# Patient Record
Sex: FEMALE | Race: BLACK OR AFRICAN AMERICAN | NOT HISPANIC OR LATINO | URBAN - METROPOLITAN AREA
[De-identification: names, ages, dates, MRNs, and addresses within clinical notes are randomized per-mention and may not be internally consistent; named-entity substitution may affect disease eponyms.]

---

## 2020-07-06 ENCOUNTER — OFFICE VISIT (OUTPATIENT)
Dept: URBAN - METROPOLITAN AREA CLINIC 118 | Facility: CLINIC | Age: 55
End: 2020-07-06

## 2020-07-17 ENCOUNTER — OFFICE VISIT (OUTPATIENT)
Dept: URBAN - METROPOLITAN AREA CLINIC 118 | Facility: CLINIC | Age: 55
End: 2020-07-17
Payer: MEDICARE

## 2020-07-17 DIAGNOSIS — R10.9 ABDOMINAL PAIN: ICD-10-CM

## 2020-07-17 DIAGNOSIS — K21.9 GERD: ICD-10-CM

## 2020-07-17 DIAGNOSIS — K58.9 IBS (IRRITABLE BOWEL SYNDROME) DIARRHEA PREDOMINANT: ICD-10-CM

## 2020-07-17 DIAGNOSIS — R19.7 DIARRHEA: ICD-10-CM

## 2020-07-17 PROCEDURE — 99214 OFFICE O/P EST MOD 30 MIN: CPT | Performed by: INTERNAL MEDICINE

## 2020-07-17 PROCEDURE — 1036F TOBACCO NON-USER: CPT | Performed by: INTERNAL MEDICINE

## 2020-07-17 PROCEDURE — G8427 DOCREV CUR MEDS BY ELIG CLIN: HCPCS | Performed by: INTERNAL MEDICINE

## 2020-07-17 PROCEDURE — G8417 CALC BMI ABV UP PARAM F/U: HCPCS | Performed by: INTERNAL MEDICINE

## 2020-07-17 PROCEDURE — 3017F COLORECTAL CA SCREEN DOC REV: CPT | Performed by: INTERNAL MEDICINE

## 2020-07-17 RX ORDER — CYCLOBENZAPRINE HYDROCHLORIDE 5 MG/1
TABLET, FILM COATED ORAL
Qty: 0 | Refills: 0 | Status: ACTIVE | COMMUNITY
Start: 1900-01-01

## 2020-07-17 RX ORDER — FLUTICASONE PROPIONATE 50 UG/1
SPRAY, METERED NASAL
Qty: 0 | Refills: 0 | Status: ACTIVE | COMMUNITY
Start: 1900-01-01

## 2020-07-17 RX ORDER — CITALOPRAM 20 MG/1
TABLET ORAL
Qty: 0 | Refills: 0 | Status: ACTIVE | COMMUNITY
Start: 1900-01-01

## 2020-07-17 NOTE — HPI-TODAY'S VISIT:
The patient presents for f/u appt; reports having increased stress/anxiety recently which has in turn led to more frequent bm's and mild abd discomfort.  takes pancreatic enzymes which has helped until recently.  denies weight loss or gi bleeding.  recent colonoscopy without significant findings.

## 2020-08-14 ENCOUNTER — ERX REFILL RESPONSE (OUTPATIENT)
Dept: URBAN - METROPOLITAN AREA CLINIC 118 | Facility: CLINIC | Age: 55
End: 2020-08-14

## 2020-08-14 RX ORDER — RIFAXIMIN 550 MG/1
1 TABLET TABLET ORAL THREE TIMES A DAY
Qty: 42 | Refills: 2

## 2020-10-09 ENCOUNTER — OFFICE VISIT (OUTPATIENT)
Dept: URBAN - METROPOLITAN AREA CLINIC 118 | Facility: CLINIC | Age: 55
End: 2020-10-09
Payer: MEDICARE

## 2020-10-09 DIAGNOSIS — R19.7 DIARRHEA: ICD-10-CM

## 2020-10-09 DIAGNOSIS — K21.9 GERD: ICD-10-CM

## 2020-10-09 DIAGNOSIS — R10.9 ABDOMINAL PAIN: ICD-10-CM

## 2020-10-09 DIAGNOSIS — K58.9 IBS (IRRITABLE BOWEL SYNDROME) DIARRHEA PREDOMINANT: ICD-10-CM

## 2020-10-09 PROCEDURE — 3017F COLORECTAL CA SCREEN DOC REV: CPT | Performed by: INTERNAL MEDICINE

## 2020-10-09 PROCEDURE — 99214 OFFICE O/P EST MOD 30 MIN: CPT | Performed by: INTERNAL MEDICINE

## 2020-10-09 PROCEDURE — G8417 CALC BMI ABV UP PARAM F/U: HCPCS | Performed by: INTERNAL MEDICINE

## 2020-10-09 PROCEDURE — 1036F TOBACCO NON-USER: CPT | Performed by: INTERNAL MEDICINE

## 2020-10-09 PROCEDURE — G8427 DOCREV CUR MEDS BY ELIG CLIN: HCPCS | Performed by: INTERNAL MEDICINE

## 2020-10-09 PROCEDURE — G8482 FLU IMMUNIZE ORDER/ADMIN: HCPCS | Performed by: INTERNAL MEDICINE

## 2020-10-09 RX ORDER — FLUTICASONE PROPIONATE 50 UG/1
SPRAY, METERED NASAL
Qty: 0 | Refills: 0 | Status: ACTIVE | COMMUNITY
Start: 1900-01-01

## 2020-10-09 RX ORDER — DICYCLOMINE HYDROCHLORIDE 10 MG/1
1 TABLET CAPSULE ORAL
Qty: 30 | Refills: 1 | OUTPATIENT
Start: 2020-10-09 | End: 2020-12-07

## 2020-10-09 RX ORDER — RIFAXIMIN 550 MG/1
1 TABLET TABLET ORAL THREE TIMES A DAY
Qty: 42 | Refills: 2 | Status: ACTIVE | COMMUNITY

## 2020-10-09 RX ORDER — CYCLOBENZAPRINE HYDROCHLORIDE 5 MG/1
TABLET, FILM COATED ORAL
Qty: 0 | Refills: 0 | Status: ACTIVE | COMMUNITY
Start: 1900-01-01

## 2020-10-09 RX ORDER — CITALOPRAM 20 MG/1
TABLET ORAL
Qty: 0 | Refills: 0 | Status: ACTIVE | COMMUNITY
Start: 1900-01-01

## 2020-10-09 NOTE — HPI-TODAY'S VISIT:
The patient presents for f/u appt; diarrhea has improved since last appt.  main complaint is mid/lower abd discomfort, occurs 2-3 times per week, unrelated to meals, and lasts ~30 minutes.  reports continues stress/anxiety and social stressors which she feels makes abd symptoms worse.  on citalopram which she feels has helped some with sx's.

## 2020-10-16 ENCOUNTER — OFFICE VISIT (OUTPATIENT)
Dept: URBAN - METROPOLITAN AREA CLINIC 118 | Facility: CLINIC | Age: 55
End: 2020-10-16

## 2021-01-15 ENCOUNTER — OFFICE VISIT (OUTPATIENT)
Dept: URBAN - METROPOLITAN AREA CLINIC 118 | Facility: CLINIC | Age: 56
End: 2021-01-15

## 2021-01-22 ENCOUNTER — OFFICE VISIT (OUTPATIENT)
Dept: URBAN - METROPOLITAN AREA CLINIC 118 | Facility: CLINIC | Age: 56
End: 2021-01-22
Payer: MEDICARE

## 2021-01-22 DIAGNOSIS — K21.9 GERD: ICD-10-CM

## 2021-01-22 DIAGNOSIS — K58.9 IBS (IRRITABLE BOWEL SYNDROME) DIARRHEA PREDOMINANT: ICD-10-CM

## 2021-01-22 DIAGNOSIS — R10.9 ABDOMINAL PAIN: ICD-10-CM

## 2021-01-22 DIAGNOSIS — R19.7 DIARRHEA: ICD-10-CM

## 2021-01-22 PROBLEM — 62315008 DIARRHEA: Status: ACTIVE | Noted: 2020-07-17

## 2021-01-22 PROCEDURE — G8427 DOCREV CUR MEDS BY ELIG CLIN: HCPCS | Performed by: INTERNAL MEDICINE

## 2021-01-22 PROCEDURE — G8483 FLU IMM NO ADMIN DOC REA: HCPCS | Performed by: INTERNAL MEDICINE

## 2021-01-22 PROCEDURE — 1036F TOBACCO NON-USER: CPT | Performed by: INTERNAL MEDICINE

## 2021-01-22 PROCEDURE — 99214 OFFICE O/P EST MOD 30 MIN: CPT | Performed by: INTERNAL MEDICINE

## 2021-01-22 PROCEDURE — 3017F COLORECTAL CA SCREEN DOC REV: CPT | Performed by: INTERNAL MEDICINE

## 2021-01-22 PROCEDURE — G8417 CALC BMI ABV UP PARAM F/U: HCPCS | Performed by: INTERNAL MEDICINE

## 2021-01-22 RX ORDER — FLUTICASONE PROPIONATE 50 UG/1
SPRAY, METERED NASAL
Qty: 0 | Refills: 0 | Status: ACTIVE | COMMUNITY
Start: 1900-01-01

## 2021-01-22 RX ORDER — CITALOPRAM 20 MG/1
TABLET ORAL
Qty: 0 | Refills: 0 | Status: ACTIVE | COMMUNITY
Start: 1900-01-01

## 2021-01-22 RX ORDER — RIFAXIMIN 550 MG/1
1 TABLET TABLET ORAL THREE TIMES A DAY
Qty: 42 | Refills: 2 | Status: ACTIVE | COMMUNITY

## 2021-01-22 RX ORDER — CYCLOBENZAPRINE HYDROCHLORIDE 5 MG/1
TABLET, FILM COATED ORAL
Qty: 0 | Refills: 0 | Status: ACTIVE | COMMUNITY
Start: 1900-01-01

## 2021-01-22 RX ORDER — PANTOPRAZOLE SODIUM 40 MG/1
1 TABLET TABLET, DELAYED RELEASE ORAL ONCE A DAY
Qty: 90 TABLET | Refills: 3 | OUTPATIENT
Start: 2021-01-22

## 2021-01-22 RX ORDER — DICYCLOMINE HYDROCHLORIDE 10 MG/1
1 TABLET CAPSULE ORAL
Qty: 60 | Refills: 3 | OUTPATIENT
Start: 2021-01-22 | End: 2021-05-22

## 2021-01-22 NOTE — HPI-TODAY'S VISIT:
The patient presents for f/u appt; h/o IBS-D; doing mostly well.  low fodmap diet has helped with symptoms (less frequent diarrhea and abd pain).  takes dicyclomine prn.  weight stable.  denies gi bleeding.

## 2021-07-28 NOTE — PHYSICAL EXAM LYMPHATIC:
Neck , no lymphadenopathy
Your evaluated for pain after injuries.  Your x-rays and CT scans were reassuring, did not show any evidence of fracture or other injury.  However you should take Tylenol and or ibuprofen as needed for pain control.  You should use the left arm sling support as needed.  You should follow-up with a primary doctor.  
oral

## 2021-07-30 ENCOUNTER — OFFICE VISIT (OUTPATIENT)
Dept: URBAN - METROPOLITAN AREA CLINIC 118 | Facility: CLINIC | Age: 56
End: 2021-07-30
Payer: MEDICARE

## 2021-07-30 ENCOUNTER — DASHBOARD ENCOUNTERS (OUTPATIENT)
Age: 56
End: 2021-07-30

## 2021-07-30 DIAGNOSIS — K21.9 GERD: ICD-10-CM

## 2021-07-30 DIAGNOSIS — K58.9 IBS (IRRITABLE BOWEL SYNDROME) DIARRHEA PREDOMINANT: ICD-10-CM

## 2021-07-30 DIAGNOSIS — R10.9 ABDOMINAL PAIN: ICD-10-CM

## 2021-07-30 PROBLEM — 10743008 IRRITABLE BOWEL SYNDROME: Status: ACTIVE | Noted: 2020-07-17

## 2021-07-30 PROCEDURE — 99213 OFFICE O/P EST LOW 20 MIN: CPT | Performed by: INTERNAL MEDICINE

## 2021-07-30 RX ORDER — DICYCLOMINE HYDROCHLORIDE 10 MG/1
1 TABLET CAPSULE ORAL
Qty: 60 | Refills: 3
Start: 2021-01-22 | End: 2021-11-27

## 2021-07-30 RX ORDER — PANTOPRAZOLE SODIUM 40 MG/1
1 TABLET TABLET, DELAYED RELEASE ORAL ONCE A DAY
Qty: 90 TABLET | Refills: 3 | OUTPATIENT

## 2021-07-30 RX ORDER — FLUTICASONE PROPIONATE 50 UG/1
SPRAY, METERED NASAL
Qty: 0 | Refills: 0 | Status: ACTIVE | COMMUNITY
Start: 1900-01-01

## 2021-07-30 RX ORDER — CITALOPRAM 20 MG/1
TABLET ORAL
Qty: 0 | Refills: 0 | Status: ACTIVE | COMMUNITY
Start: 1900-01-01

## 2021-07-30 RX ORDER — RIFAXIMIN 550 MG/1
1 TABLET TABLET ORAL THREE TIMES A DAY
Qty: 42 | Refills: 2 | Status: ACTIVE | COMMUNITY

## 2021-07-30 RX ORDER — PANTOPRAZOLE SODIUM 40 MG/1
1 TABLET TABLET, DELAYED RELEASE ORAL ONCE A DAY
Qty: 90 TABLET | Refills: 3 | Status: ACTIVE | COMMUNITY
Start: 2021-01-22

## 2021-07-30 RX ORDER — CYCLOBENZAPRINE HYDROCHLORIDE 5 MG/1
TABLET, FILM COATED ORAL
Qty: 0 | Refills: 0 | Status: ACTIVE | COMMUNITY
Start: 1900-01-01

## 2021-07-30 NOTE — HPI-TODAY'S VISIT:
The patient presents for f/u appt; h/o IBS-mixed (mostly diarrhea), takes dicyclomine prn (~2 per month) for abd pain which helps relieve sx's.  denies new gi complaints or worsening of gi symptoms.

## 2024-02-29 NOTE — PHYSICAL EXAM CHEST:
no lesions,  no deformities,  no traumatic injuries,  no significant scars are present,  chest wall non-tender,  no masses present, breathing is unlabored without accessory muscle use,normal breath sounds
2 = difficulty speaking (not related to language barrier)

## 2025-05-01 ENCOUNTER — TELEPHONE ENCOUNTER (OUTPATIENT)
Dept: URBAN - METROPOLITAN AREA CLINIC 118 | Facility: CLINIC | Age: 60
End: 2025-05-01

## 2025-07-07 ENCOUNTER — LAB OUTSIDE AN ENCOUNTER (OUTPATIENT)
Dept: URBAN - METROPOLITAN AREA CLINIC 118 | Facility: CLINIC | Age: 60
End: 2025-07-07

## 2025-07-07 ENCOUNTER — OFFICE VISIT (OUTPATIENT)
Dept: URBAN - METROPOLITAN AREA CLINIC 118 | Facility: CLINIC | Age: 60
End: 2025-07-07
Payer: MEDICARE

## 2025-07-07 DIAGNOSIS — I25.10 CAD IN NATIVE ARTERY: ICD-10-CM

## 2025-07-07 DIAGNOSIS — R19.5 POSITIVE COLORECTAL CANCER SCREENING USING COLOGUARD TEST: ICD-10-CM

## 2025-07-07 DIAGNOSIS — Z79.02 PLATELET INHIBITION DUE TO PLAVIX: ICD-10-CM

## 2025-07-07 PROBLEM — 53741008: Status: ACTIVE | Noted: 2025-07-07

## 2025-07-07 PROBLEM — 128095007: Status: ACTIVE | Noted: 2025-07-07

## 2025-07-07 PROCEDURE — 99203 OFFICE O/P NEW LOW 30 MIN: CPT | Performed by: INTERNAL MEDICINE

## 2025-07-07 RX ORDER — CYCLOBENZAPRINE HYDROCHLORIDE 5 MG/1
TABLET, FILM COATED ORAL
Qty: 0 | Refills: 0 | Status: ON HOLD | COMMUNITY
Start: 1900-01-01

## 2025-07-07 RX ORDER — RIFAXIMIN 550 MG/1
1 TABLET TABLET ORAL THREE TIMES A DAY
Qty: 42 | Refills: 2 | Status: ON HOLD | COMMUNITY

## 2025-07-07 RX ORDER — PANTOPRAZOLE SODIUM 40 MG/1
1 TABLET TABLET, DELAYED RELEASE ORAL ONCE A DAY
Qty: 90 TABLET | Refills: 3 | Status: ON HOLD | COMMUNITY

## 2025-07-07 RX ORDER — CITALOPRAM 20 MG/1
TABLET ORAL
Qty: 0 | Refills: 0 | Status: ON HOLD | COMMUNITY
Start: 1900-01-01

## 2025-07-07 RX ORDER — FLUTICASONE PROPIONATE 50 UG/1
SPRAY, METERED NASAL
Qty: 0 | Refills: 0 | Status: ON HOLD | COMMUNITY
Start: 1900-01-01

## 2025-07-07 NOTE — HPI-TODAY'S VISIT:
The patient presents for evaluation of positive cologuard.  History of IBS-mixed  chronic lower abd discomfort which is non-progressive/mostly unchanged.   last colonoscopy ~5 years ago.  h/o tics.  + cologuard recently.  denies overt gi bleeding.  weight stable.  on ozempic.  h/o cad and on aspirin/plavix (sees Dr Bryant/[facility]).

## 2025-07-07 NOTE — PHYSICAL EXAM CARDIOVASCULAR:
no murmurs, regular rate and rhythm PAST SURGICAL HISTORY:  H/O gastric sleeve     H/O hypercalcemia     H/O parathyroidectomy     History of mitral valve repair 2008    ICD Guidant    S/P IVC filter 2008    S/P Partial Gastrectomy     Status post heart transplant

## 2025-07-29 ENCOUNTER — OFFICE VISIT (OUTPATIENT)
Dept: URBAN - METROPOLITAN AREA SURGERY CENTER 23 | Facility: SURGERY CENTER | Age: 60
End: 2025-07-29